# Patient Record
Sex: MALE | Race: WHITE
[De-identification: names, ages, dates, MRNs, and addresses within clinical notes are randomized per-mention and may not be internally consistent; named-entity substitution may affect disease eponyms.]

---

## 2017-03-21 ENCOUNTER — HOSPITAL ENCOUNTER (OUTPATIENT)
Dept: HOSPITAL 45 - C.LABPVFM | Age: 57
Discharge: HOME | End: 2017-03-21
Attending: NURSE PRACTITIONER
Payer: COMMERCIAL

## 2017-03-21 DIAGNOSIS — D64.9: Primary | ICD-10-CM

## 2017-03-21 DIAGNOSIS — K31.811: ICD-10-CM

## 2017-03-21 DIAGNOSIS — E11.9: ICD-10-CM

## 2017-03-21 LAB
ANION GAP SERPL CALC-SCNC: 6 MMOL/L (ref 3–11)
ANISOCYTOSIS BLD QL SMEAR: PRESENT
BASOPHILS # BLD: 0.03 K/UL (ref 0–0.2)
BASOPHILS NFR BLD: 0.4 %
BUN SERPL-MCNC: 13 MG/DL (ref 7–18)
BUN/CREAT SERPL: 13.5 (ref 10–20)
CALCIUM SERPL-MCNC: 8.6 MG/DL (ref 8.5–10.1)
CHLORIDE SERPL-SCNC: 107 MMOL/L (ref 98–107)
CO2 SERPL-SCNC: 27 MMOL/L (ref 21–32)
COMPLETE: YES
CREAT SERPL-MCNC: 0.96 MG/DL (ref 0.6–1.4)
EOSINOPHIL NFR BLD AUTO: 185 K/UL (ref 130–400)
EST. AVERAGE GLUCOSE BLD GHB EST-MCNC: 154 MG/DL
GIANT PLATELETS BLD QL SMEAR: (no result)
GLUCOSE SERPL-MCNC: 176 MG/DL (ref 70–99)
HCT VFR BLD CALC: 30.5 % (ref 42–52)
HYPOCHROMIA BLD QL SMEAR: PRESENT
IG%: 0.3 %
IMM GRANULOCYTES NFR BLD AUTO: 14.8 %
LYMPHOCYTES # BLD: 1.01 K/UL (ref 1.2–3.4)
MCH RBC QN AUTO: 15.8 PG (ref 25–34)
MCHC RBC AUTO-ENTMCNC: 25.9 G/DL (ref 32–36)
MCV RBC AUTO: 60.9 FL (ref 80–100)
MICROCYTES BLD QL SMEAR: PRESENT
MONOCYTES NFR BLD: 8.1 %
NEUTROPHILS # BLD AUTO: 2.1 %
NEUTROPHILS NFR BLD AUTO: 74.3 %
POIKILOCYTOSIS BLD QL SMEAR: PRESENT
POLYCHROMASIA BLD QL SMEAR: (no result)
POTASSIUM SERPL-SCNC: 4.3 MMOL/L (ref 3.5–5.1)
RBC # BLD AUTO: 5.01 M/UL (ref 4.7–6.1)
SODIUM SERPL-SCNC: 140 MMOL/L (ref 136–145)
WBC # BLD AUTO: 6.82 K/UL (ref 4.8–10.8)

## 2017-05-30 ENCOUNTER — HOSPITAL ENCOUNTER (OUTPATIENT)
Dept: HOSPITAL 45 - C.LABPVFM | Age: 57
Discharge: HOME | End: 2017-05-30
Attending: NURSE PRACTITIONER
Payer: COMMERCIAL

## 2017-05-30 DIAGNOSIS — D64.9: Primary | ICD-10-CM

## 2017-05-30 LAB
ANISOCYTOSIS BLD QL SMEAR: PRESENT
BASOPHILS # BLD: 0.08 K/UL (ref 0–0.2)
BASOPHILS NFR BLD: 1 %
COMPLETE: YES
EOSINOPHIL NFR BLD AUTO: 306 K/UL (ref 130–400)
HCT VFR BLD CALC: 35.4 % (ref 42–52)
HYPOCHROMIA BLD QL SMEAR: PRESENT
IG%: 0.6 %
IMM GRANULOCYTES NFR BLD AUTO: 15 %
LYMPHOCYTES # BLD: 1.2 K/UL (ref 1.2–3.4)
MCH RBC QN AUTO: 17.7 PG (ref 25–34)
MCHC RBC AUTO-ENTMCNC: 28.2 G/DL (ref 32–36)
MCV RBC AUTO: 62.8 FL (ref 80–100)
MONOCYTES NFR BLD: 12.2 %
NEUTROPHILS # BLD AUTO: 1.7 %
NEUTROPHILS NFR BLD AUTO: 69.5 %
OVALOCYTES BLD QL SMEAR: (no result)
POLYCHROMASIA BLD QL SMEAR: (no result)
RBC # BLD AUTO: 5.64 M/UL (ref 4.7–6.1)
SPHEROCYTES BLD QL SMEAR: (no result)
WBC # BLD AUTO: 8.08 K/UL (ref 4.8–10.8)

## 2017-09-02 ENCOUNTER — HOSPITAL ENCOUNTER (OUTPATIENT)
Dept: HOSPITAL 45 - C.LABPVFM | Age: 57
Discharge: HOME | End: 2017-09-02
Attending: FAMILY MEDICINE
Payer: COMMERCIAL

## 2017-09-02 DIAGNOSIS — D64.9: ICD-10-CM

## 2017-09-02 DIAGNOSIS — E78.5: Primary | ICD-10-CM

## 2017-09-02 DIAGNOSIS — E11.9: ICD-10-CM

## 2017-09-02 LAB
ALBUMIN/GLOB SERPL: 0.9 {RATIO} (ref 0.9–2)
ALP SERPL-CCNC: 116 U/L (ref 45–117)
ALT SERPL-CCNC: 17 U/L (ref 12–78)
ANION GAP SERPL CALC-SCNC: 2 MMOL/L (ref 3–11)
AST SERPL-CCNC: 11 U/L (ref 15–37)
BASOPHILS # BLD: 0.06 K/UL (ref 0–0.2)
BASOPHILS NFR BLD: 1.2 %
BUN SERPL-MCNC: 12 MG/DL (ref 7–18)
BUN/CREAT SERPL: 15.4 (ref 10–20)
CALCIUM SERPL-MCNC: 8.8 MG/DL (ref 8.5–10.1)
CHLORIDE SERPL-SCNC: 111 MMOL/L (ref 98–107)
CHOLEST/HDLC SERPL: 4.5 {RATIO}
CO2 SERPL-SCNC: 27 MMOL/L (ref 21–32)
COMPLETE: YES
CREAT SERPL-MCNC: 0.76 MG/DL (ref 0.6–1.4)
EOSINOPHIL NFR BLD AUTO: 219 K/UL (ref 130–400)
EST. AVERAGE GLUCOSE BLD GHB EST-MCNC: 329 MG/DL
GLOBULIN SER-MCNC: 3.8 GM/DL (ref 2.5–4)
GLUCOSE SERPL-MCNC: 206 MG/DL (ref 70–99)
GLUCOSE UR QL: 38 MG/DL
HCT VFR BLD CALC: 39.4 % (ref 42–52)
HYPOCHROMIA BLD QL SMEAR: PRESENT
IG%: 0.2 %
IMM GRANULOCYTES NFR BLD AUTO: 22.4 %
KETONES UR QL STRIP: 98 MG/DL
LYMPHOCYTES # BLD: 1.1 K/UL (ref 1.2–3.4)
MCH RBC QN AUTO: 18.8 PG (ref 25–34)
MCHC RBC AUTO-ENTMCNC: 29.2 G/DL (ref 32–36)
MCV RBC AUTO: 64.5 FL (ref 80–100)
MICROCYTES BLD QL SMEAR: PRESENT
MONOCYTES NFR BLD: 8.6 %
NEUTROPHILS # BLD AUTO: 4.9 %
NEUTROPHILS NFR BLD AUTO: 62.7 %
NITRITE UR QL STRIP: 175 MG/DL (ref 0–150)
PH UR: 171 MG/DL (ref 0–200)
POTASSIUM SERPL-SCNC: 4.1 MMOL/L (ref 3.5–5.1)
RBC # BLD AUTO: 6.11 M/UL (ref 4.7–6.1)
SODIUM SERPL-SCNC: 140 MMOL/L (ref 136–145)
SPHEROCYTES BLD QL SMEAR: (no result)
VERY LOW DENSITY LIPOPROT CALC: 35 MG/DL
WBC # BLD AUTO: 4.9 K/UL (ref 4.8–10.8)

## 2017-10-11 ENCOUNTER — HOSPITAL ENCOUNTER (OUTPATIENT)
Dept: HOSPITAL 45 - C.MRIBC | Age: 57
Discharge: HOME | End: 2017-10-11
Attending: UROLOGY
Payer: COMMERCIAL

## 2017-10-11 DIAGNOSIS — R97.20: Primary | ICD-10-CM

## 2017-10-11 NOTE — DIAGNOSTIC IMAGING REPORT
PROSTATE MRI COMBO



CLINICAL HISTORY: 56 years-old Male presenting with R97.20. PSA 24.9 ng/mL. 



TECHNIQUE: Multisequence, multiplanar MR imaging of the prostate was performed

before and after the administration of intravenous contrast. Additional

postprocessing was performed on a separate [employer] workstation by the

radiologist for 3-D volumetric segmentation of the prostate and contouring of

region(s) of interest (CHADD) for targeting. IV contrast: None. 



COMPARISON: None.



FINDINGS:



Prostate: The prostate measures 7.0 cm in transverse diameter ([employer] prostate

boundary segmentation volume 157 mL). There is advanced benign prostatic

hyperplasia. Median lobe hypertrophy is observed. A small utricular cyst is

incidentally noted and measures up to 9 mm. Precontrast T1 weighted imaging

demonstrates no T1 hyperintense lesions. Seminal vesicles normal. No suspicious

lesion is apparent in the transition or peripheral zones.



Bladder: The bladder wall is thickened and trabeculated consistent with chronic

outlet obstruction.



Bowel: Visualized portion of the rectum normal.



Peritoneum: No free fluid in the pelvis.



Lymph nodes: No lymphadenopathy in the visualized portion of the pelvis.



Vasculature: Iliac vessels patent.



Osseous structures: No destructive bony lesion is seen.





IMPRESSION:



1.  Severe benign prostatic hyperplasia .



2. No concerning prostatic lesion is identified.



3. A small utricular cyst is incidentally noted. 



4. There is evidence of chronic bladder outlet obstruction.







Electronically signed by:  Blue Solorzano M.D.

10/11/2017 9:37 AM



Dictated Date/Time:  10/11/2017 9:20 AM

## 2018-02-05 ENCOUNTER — HOSPITAL ENCOUNTER (OUTPATIENT)
Dept: HOSPITAL 45 - C.LABPVFM | Age: 58
Discharge: HOME | End: 2018-02-05
Attending: NURSE PRACTITIONER
Payer: COMMERCIAL

## 2018-02-05 DIAGNOSIS — D64.9: Primary | ICD-10-CM

## 2018-02-05 DIAGNOSIS — E11.9: ICD-10-CM

## 2018-02-05 LAB
BASOPHILS # BLD: 0.06 K/UL (ref 0–0.2)
BASOPHILS NFR BLD: 0.9 %
BUN SERPL-MCNC: 10 MG/DL (ref 7–18)
CALCIUM SERPL-MCNC: 9 MG/DL (ref 8.5–10.1)
CO2 SERPL-SCNC: 29 MMOL/L (ref 21–32)
CREAT SERPL-MCNC: 0.96 MG/DL (ref 0.6–1.4)
EOS ABS #: 0.23 K/UL (ref 0–0.5)
EOSINOPHIL NFR BLD AUTO: 204 K/UL (ref 130–400)
GLUCOSE SERPL-MCNC: 116 MG/DL (ref 70–99)
HCT VFR BLD CALC: 35.8 % (ref 42–52)
HGB BLD-MCNC: 10.3 G/DL (ref 14–18)
IG#: 0.03 K/UL (ref 0–0.02)
IMM GRANULOCYTES NFR BLD AUTO: 22.5 %
LYMPHOCYTES # BLD: 1.44 K/UL (ref 1.2–3.4)
MCH RBC QN AUTO: 17.2 PG (ref 25–34)
MCHC RBC AUTO-ENTMCNC: 28.8 G/DL (ref 32–36)
MCV RBC AUTO: 59.9 FL (ref 80–100)
MONO ABS #: 0.63 K/UL (ref 0.11–0.59)
MONOCYTES NFR BLD: 9.9 %
NEUT ABS #: 4 K/UL (ref 1.4–6.5)
NEUTROPHILS # BLD AUTO: 3.6 %
NEUTROPHILS NFR BLD AUTO: 62.6 %
POTASSIUM SERPL-SCNC: 3.5 MMOL/L (ref 3.5–5.1)
RED CELL DISTRIBUTION WIDTH CV: 18.7 % (ref 11.5–14.5)
RED CELL DISTRIBUTION WIDTH SD: 39.6 FL (ref 36.4–46.3)
SODIUM SERPL-SCNC: 136 MMOL/L (ref 136–145)
WBC # BLD AUTO: 6.39 K/UL (ref 4.8–10.8)

## 2018-02-06 LAB — HBA1C MFR BLD: 6.7 % (ref 4.5–5.6)
